# Patient Record
Sex: FEMALE | Race: BLACK OR AFRICAN AMERICAN | NOT HISPANIC OR LATINO | URBAN - METROPOLITAN AREA
[De-identification: names, ages, dates, MRNs, and addresses within clinical notes are randomized per-mention and may not be internally consistent; named-entity substitution may affect disease eponyms.]

---

## 2017-07-24 ENCOUNTER — EMERGENCY (EMERGENCY)
Facility: HOSPITAL | Age: 37
LOS: 1 days | Discharge: HOME | End: 2017-07-24
Attending: EMERGENCY MEDICINE | Admitting: EMERGENCY MEDICINE
Payer: COMMERCIAL

## 2017-07-24 VITALS
SYSTOLIC BLOOD PRESSURE: 141 MMHG | RESPIRATION RATE: 20 BRPM | HEART RATE: 90 BPM | DIASTOLIC BLOOD PRESSURE: 93 MMHG | TEMPERATURE: 98 F | OXYGEN SATURATION: 100 %

## 2017-07-24 PROCEDURE — 99284 EMERGENCY DEPT VISIT MOD MDM: CPT

## 2017-07-24 RX ORDER — ONDANSETRON 8 MG/1
4 TABLET, FILM COATED ORAL ONCE
Qty: 0 | Refills: 0 | Status: COMPLETED | OUTPATIENT
Start: 2017-07-24 | End: 2017-07-24

## 2017-07-24 RX ADMIN — ONDANSETRON 4 MILLIGRAM(S): 8 TABLET, FILM COATED ORAL at 23:39

## 2017-07-24 NOTE — ED ADULT NURSE NOTE - OBJECTIVE STATEMENT
36 y/o F presents ambulatory to ED c/o constant nausea, intermittent indigestion, tolerating liquids but vomiting after solids, feels like something is stuck in her throat x 1 week. Pt denies abdominal pain, diarrhea, headache, dizziness, SOB, cough, chest pain, palpitations, f/c. Pt reports mild back pain with movement. Safety maintained. 36 y/o F presents ambulatory to ED c/o constant nausea, intermittent indigestion, tolerating liquids but vomiting after solids, feels like something is stuck in her throat x 1 week. Pt denies abdominal pain, diarrhea, headache, dizziness, SOB, cough, chest pain, palpitations, f/c. Pt reports mild back pain with movement. Safety maintained.  0200: Pt AAOx4, NAD, resting comfortably in bed. Pt tolerated PO with no n/v/d, abd pain. Pt denies headache, dizziness, chest pain, cough, SOB, abdominal pain, n/v/d, urinary symptoms, fevers, chills, weakness at this time. Pt discharged as per MD, IV removed as per MD, pt ambulated independently out of ED, steady gait noted.

## 2017-07-24 NOTE — ED PROVIDER NOTE - ATTENDING CONTRIBUTION TO CARE
Dr. Walker (Attending Physician)  Pt. with sensation of food getting stuck in throat. Tolerating liquids.  Tolerates some solids.  PO challenged in ED and able to tolerate.  Heart and lungs clear.  Will check cxr, basic labs, likely have patient follow-up with gastroenterology.

## 2017-07-24 NOTE — ED PROVIDER NOTE - NS ED ROS FT
CONSTITUTIONAL: No fevers, no chills  Eyes: no visual changes  Ears: no ear drainage, no ear pain  Nose: no nasal congestion  Mouth/Throat: no sore throat  Cardiovascular: No Chest pain  Respiratory: No SOB  Gastrointestinal: +n/v, no abd pain  Genitourinary: no dysuria, no hematuria  SKIN: no rashes.  NEURO: no headache  PSYCHIATRIC: no known mental health issues.

## 2017-07-24 NOTE — ED ADULT NURSE NOTE - CHPI ED SYMPTOMS NEG
no fever/no blood in stool/no diarrhea/no chills/no dysuria/no burning urination/no hematuria/no abdominal distension

## 2017-07-24 NOTE — ED PROVIDER NOTE - PROGRESS NOTE DETAILS
pt tolerating PO will d/c home with GI follow up. Patient was endorsed to me by Dr. Walker     at   1230   to follow up results of   xray chest      and dispo pending these results and re evaluation. Upon my re evaluation of the patient I found that xray normal. pt feeling better. back pain worse with movement. will follow up with gi. Crow Sinha M.D., Attending Physician

## 2017-07-24 NOTE — ED PROVIDER NOTE - OBJECTIVE STATEMENT
37 YOF presents to ED with 1 week of indigestion, feeling of food getting stuck in her throat, non-smoker. Pt states she vomits when she eats solids is able to tolerate liquids. Pt states she is constantly nauseated. Pt denies any abd pain, neck pain, headaches, chest pain, sob. Pt also has mid back pain with movement.       No PMD.

## 2017-07-24 NOTE — ED ADULT NURSE NOTE - DISCHARGE TEACHING
Kenisha MANUEL, pt verbalizes understanding to f/u with GI, take Maalox for indigestion, and return to ED for any worsening symptoms.

## 2017-07-24 NOTE — ED PROVIDER NOTE - MEDICAL DECISION MAKING DETAILS
N/v x 1 week able to tolerate fluids but not solids, sensation of food getting stuck. Will check electrolytes if normal d/c home with GI follow up.

## 2017-07-25 VITALS
OXYGEN SATURATION: 100 % | HEART RATE: 77 BPM | RESPIRATION RATE: 18 BRPM | DIASTOLIC BLOOD PRESSURE: 88 MMHG | TEMPERATURE: 98 F | SYSTOLIC BLOOD PRESSURE: 131 MMHG

## 2017-07-25 LAB
ALBUMIN SERPL ELPH-MCNC: 4 G/DL — SIGNIFICANT CHANGE UP (ref 3.3–5)
ALP SERPL-CCNC: 49 U/L — SIGNIFICANT CHANGE UP (ref 40–120)
ALT FLD-CCNC: 11 U/L RC — SIGNIFICANT CHANGE UP (ref 10–45)
ANION GAP SERPL CALC-SCNC: 12 MMOL/L — SIGNIFICANT CHANGE UP (ref 5–17)
AST SERPL-CCNC: 15 U/L — SIGNIFICANT CHANGE UP (ref 10–40)
BASOPHILS # BLD AUTO: 0 K/UL — SIGNIFICANT CHANGE UP (ref 0–0.2)
BASOPHILS NFR BLD AUTO: 0 % — SIGNIFICANT CHANGE UP (ref 0–2)
BILIRUB SERPL-MCNC: 0.3 MG/DL — SIGNIFICANT CHANGE UP (ref 0.2–1.2)
BUN SERPL-MCNC: 12 MG/DL — SIGNIFICANT CHANGE UP (ref 7–23)
CALCIUM SERPL-MCNC: 9.2 MG/DL — SIGNIFICANT CHANGE UP (ref 8.4–10.5)
CHLORIDE SERPL-SCNC: 106 MMOL/L — SIGNIFICANT CHANGE UP (ref 96–108)
CO2 SERPL-SCNC: 25 MMOL/L — SIGNIFICANT CHANGE UP (ref 22–31)
CREAT SERPL-MCNC: 0.62 MG/DL — SIGNIFICANT CHANGE UP (ref 0.5–1.3)
EOSINOPHIL # BLD AUTO: 0.5 K/UL — SIGNIFICANT CHANGE UP (ref 0–0.5)
EOSINOPHIL NFR BLD AUTO: 5.9 % — SIGNIFICANT CHANGE UP (ref 0–6)
GLUCOSE SERPL-MCNC: 136 MG/DL — HIGH (ref 70–99)
HCT VFR BLD CALC: 31.9 % — LOW (ref 34.5–45)
HGB BLD-MCNC: 10.4 G/DL — LOW (ref 11.5–15.5)
LIDOCAIN IGE QN: 33 U/L — SIGNIFICANT CHANGE UP (ref 7–60)
LYMPHOCYTES # BLD AUTO: 3.6 K/UL — HIGH (ref 1–3.3)
LYMPHOCYTES # BLD AUTO: 40.2 % — SIGNIFICANT CHANGE UP (ref 13–44)
MCHC RBC-ENTMCNC: 26.4 PG — LOW (ref 27–34)
MCHC RBC-ENTMCNC: 32.5 GM/DL — SIGNIFICANT CHANGE UP (ref 32–36)
MCV RBC AUTO: 81.1 FL — SIGNIFICANT CHANGE UP (ref 80–100)
MONOCYTES # BLD AUTO: 0.8 K/UL — SIGNIFICANT CHANGE UP (ref 0–0.9)
MONOCYTES NFR BLD AUTO: 9.4 % — SIGNIFICANT CHANGE UP (ref 2–14)
NEUTROPHILS # BLD AUTO: 4 K/UL — SIGNIFICANT CHANGE UP (ref 1.8–7.4)
NEUTROPHILS NFR BLD AUTO: 44.5 % — SIGNIFICANT CHANGE UP (ref 43–77)
PLATELET # BLD AUTO: 382 K/UL — SIGNIFICANT CHANGE UP (ref 150–400)
POTASSIUM SERPL-MCNC: 3.6 MMOL/L — SIGNIFICANT CHANGE UP (ref 3.5–5.3)
POTASSIUM SERPL-SCNC: 3.6 MMOL/L — SIGNIFICANT CHANGE UP (ref 3.5–5.3)
PROT SERPL-MCNC: 7.9 G/DL — SIGNIFICANT CHANGE UP (ref 6–8.3)
RBC # BLD: 3.93 M/UL — SIGNIFICANT CHANGE UP (ref 3.8–5.2)
RBC # FLD: 14.4 % — SIGNIFICANT CHANGE UP (ref 10.3–14.5)
SODIUM SERPL-SCNC: 143 MMOL/L — SIGNIFICANT CHANGE UP (ref 135–145)
WBC # BLD: 9 K/UL — SIGNIFICANT CHANGE UP (ref 3.8–10.5)
WBC # FLD AUTO: 9 K/UL — SIGNIFICANT CHANGE UP (ref 3.8–10.5)

## 2017-07-25 PROCEDURE — 99284 EMERGENCY DEPT VISIT MOD MDM: CPT | Mod: 25

## 2017-07-25 PROCEDURE — 80053 COMPREHEN METABOLIC PANEL: CPT

## 2017-07-25 PROCEDURE — 85027 COMPLETE CBC AUTOMATED: CPT

## 2017-07-25 PROCEDURE — 71046 X-RAY EXAM CHEST 2 VIEWS: CPT

## 2017-07-25 PROCEDURE — 71020: CPT | Mod: 26

## 2017-07-25 PROCEDURE — 83690 ASSAY OF LIPASE: CPT

## 2017-07-25 PROCEDURE — 96374 THER/PROPH/DIAG INJ IV PUSH: CPT

## 2017-07-25 RX ORDER — LIDOCAINE 4 G/100G
10 CREAM TOPICAL ONCE
Qty: 0 | Refills: 0 | Status: COMPLETED | OUTPATIENT
Start: 2017-07-25 | End: 2017-07-25

## 2017-07-25 RX ADMIN — LIDOCAINE 10 MILLILITER(S): 4 CREAM TOPICAL at 01:09

## 2017-07-25 RX ADMIN — Medication 30 MILLILITER(S): at 01:09

## 2017-10-17 ENCOUNTER — APPOINTMENT (OUTPATIENT)
Dept: GASTROENTEROLOGY | Facility: CLINIC | Age: 37
End: 2017-10-17

## 2023-03-24 ENCOUNTER — NON-APPOINTMENT (OUTPATIENT)
Age: 43
End: 2023-03-24

## 2023-03-25 ENCOUNTER — EMERGENCY (EMERGENCY)
Facility: HOSPITAL | Age: 43
LOS: 1 days | Discharge: HOME | End: 2023-03-25
Attending: EMERGENCY MEDICINE
Payer: MEDICAID

## 2023-03-25 VITALS
RESPIRATION RATE: 18 BRPM | OXYGEN SATURATION: 98 % | SYSTOLIC BLOOD PRESSURE: 152 MMHG | DIASTOLIC BLOOD PRESSURE: 95 MMHG | TEMPERATURE: 99 F | HEART RATE: 92 BPM | HEIGHT: 63 IN

## 2023-03-25 PROCEDURE — 87075 CULTR BACTERIA EXCEPT BLOOD: CPT

## 2023-03-25 PROCEDURE — 87205 SMEAR GRAM STAIN: CPT

## 2023-03-25 PROCEDURE — 76642 ULTRASOUND BREAST LIMITED: CPT

## 2023-03-25 PROCEDURE — 99284 EMERGENCY DEPT VISIT MOD MDM: CPT

## 2023-03-25 PROCEDURE — 87070 CULTURE OTHR SPECIMN AEROBIC: CPT

## 2023-03-25 PROCEDURE — 10060 I&D ABSCESS SIMPLE/SINGLE: CPT

## 2023-03-25 PROCEDURE — 99284 EMERGENCY DEPT VISIT MOD MDM: CPT | Mod: 25

## 2023-03-25 PROCEDURE — 87077 CULTURE AEROBIC IDENTIFY: CPT

## 2023-03-25 PROCEDURE — 76642 ULTRASOUND BREAST LIMITED: CPT | Mod: 26,LT

## 2023-03-25 NOTE — ED PROVIDER NOTE - PHYSICAL EXAMINATION
left 4 oclock breast lesion that is indurated and tenderness to palpation with warmth and erythema chaperoned by MAR LEUNG Shaperio  non-tachycardic  non-tachypneic   mild distress secondary to pain

## 2023-03-25 NOTE — ED PROVIDER NOTE - PATIENT PORTAL LINK FT
You can access the FollowMyHealth Patient Portal offered by [facility] by registering at the following website: [email or web address]. By joining [email or web address] [email or web address] portal, you will also be able to view your health information using other applications (apps) compatible with our system.

## 2023-03-25 NOTE — ED PROVIDER NOTE - ATTENDING CONTRIBUTION TO CARE
See MDM above. pt is a 43 y/o female with pain to her l breast around 4 oclock, with redness but no drainage, no fevers, no prior breast issues, will need labs, us, signed out pending work up. pt is a 43 y/o female with pain to her l breast around 4 oclock, with redness but no drainage, no fevers, with prior abscess noted drained in the office a few weeks ago but reoccurred, will need labs, us, signed out pending work up.

## 2023-03-25 NOTE — PROCEDURE NOTE - ADDITIONAL PROCEDURE DETAILS
prior I&D attempt by ED with 1cm incision  local 1% lido injected into previously made incision  6cc of pus aspirated from abscess  incision deepened by additional 1mm to access abscess cavity, no additional pus expressed  packed with 1/2 inch iodoform packing strip  covered with gauze and tegaderm

## 2023-03-25 NOTE — CONSULT NOTE ADULT - SUBJECTIVE AND OBJECTIVE BOX
General Surgery Consult  Consulting surgical team: [facility]  Consulting attending: Dr. Reyes    HPI:  42F hx of ventral hernia repair with mesh c/b infection s/p excision +MRSA per chart review and R breast abscess drained in a doctor's office a few years ago here with 2 weeks of L breast pain and chills, denies fevers. Mammogram 2 years ago reportedly normal, no family hx of breast cancer. US 1p3m3ay superficial breast abscess. ED attempted I&D prior to surgical consultation without successful drainage of pus. Breast surgery consulted.    PAST MEDICAL HISTORY:  Asthma        PAST SURGICAL HISTORY:  S/P hernia repair        MEDICATIONS:      ALLERGIES:  No Known Allergies      VITALS & I/Os:  Vital Signs Last 24 Hrs  T(C): 37.1 (25 Mar 2023 11:00), Max: 37.1 (25 Mar 2023 11:00)  T(F): 98.7 (25 Mar 2023 11:00), Max: 98.7 (25 Mar 2023 11:00)  HR: 92 (25 Mar 2023 11:00) (92 - 92)  BP: 152/95 (25 Mar 2023 11:00) (152/95 - 152/95)  BP(mean): --  RR: 18 (25 Mar 2023 11:00) (18 - 18)  SpO2: 98% (25 Mar 2023 11:00) (98% - 98%)        I&O's Summary      PHYSICAL EXAM:  General: No acute distress  very pleasant  Respiratory: Nonlabored  Cardiovascular: appears well perfused  Breast: no palpable masses in L breast  L central fluctuance with surrounding 4cm area of induration with darkened skin changes and significant tenderness approx 8cm inferior to nipple  Abdominal: Soft, nondistended, nontender  Extremities: Warm    LABS:          Lactate:                  IMAGING:    ACC: [id number] EXAM:  US BREAST LIMITED LT   ORDERED BY: ADELINA CRUZ     PROCEDURE DATE:  03/25/2023          INTERPRETATION:  CLINICAL INDICATION:  Mastitis.  Rule out abscess.    TECHNIQUE:  Limited sonographic evaluation of the left breast was   performed, targeted to the area of pain in the 4-5:00 breast. ?Evaluation   was performed by the technologist and submitted for interpretation..    This study was performed exclusively for the purpose of excluding the   presence of abscess in the clinical setting of mastitis.?  ?  FINDINGS: Left breast, 4-5:00, 10 cm from the nipple is a fluid   collection measuring approximately 2.4 x 1.5 x 3 cm with overlying skin   thickening and surrounding hyperemia, suspicious for abscess.    IMPRESSION:  Left breast fluid collection, suspicious for abscess in the appropriate   clinical setting.  Clinical management is recommended.    If symptoms do not resolve clinically, additional imaging in a dedicated   breast imaging center should be performed to exclude the possibility of   malignancy.    --- End of Report ---      DEBBY ANTHONY MD; Attending Radiologist  This document has been electronically signed. Mar 25 2023  2:00PM

## 2023-03-25 NOTE — ED PROVIDER NOTE - PROGRESS NOTE DETAILS
I was not involved in the care of this patient and am only entering information to back log for downtime. Please see paper chart for isolation and medical management details, as the mandatory fields in the disposition section may not be accurate. - Luisito Vega PA-C MDM: pt is a 41 y/o female with an abscess noted, us ordered, labs, signed out pending work up.

## 2023-03-25 NOTE — ED PROVIDER NOTE - SHIFT CHANGE DETAILS
Nino Thomson MD FACEP note of transfer at the usual time of sign out: Receiving team will follow up on labs, analgesia, any clinical imaging, reassess and disposition as clinically indicated.  Details of patient and plan conveyed to receiving physician and conveyed back for understanding.  There were no questions at this time about the patient's status, disposition, and plan. Patient's care to be taken over by receiving physician at this time, all decisions regarding the progression of care will be made at their discretion.

## 2023-03-25 NOTE — ED PROVIDER NOTE - CLINICAL SUMMARY MEDICAL DECISION MAKING FREE TEXT BOX
Nino Thomson MD, FACEP: In this physician's medical judgement based on clinical history and physical exam the patient's signs and symptoms lead to differential diagnoses which includes but is not limited to: left breast abscess, breast cancer    Labs were ordered and independently reviewed by me.  Imaging was ordered and reviewed by me.      Appropriate medications for the patient's presenting complaints were ordered, and effects were reassessed.     Escalation to admission/observation was considered.    Will follow up on labs, therapeutics, imaging, reassess and disposition as clinically indicated.  *The above represents an initial assessment/impression. Please refer to my progress notes below for potential changes in patient clinical course*     Patient  understands anticipatory guidance and was given strict return and follow up precautions. The patient has been informed of all concerning signs and symptoms to return to Emergency Department, the necessity to follow up with PMD/Clinic/follow up provided within 2-3 days was explained, and the patient reports understanding of above with capacity and insight if patient disposition is to be home. Nino Thomson MD, FACEP: In this physician's medical judgement based on clinical history and physical exam the patient's signs and symptoms lead to differential diagnoses which includes but is not limited to: left breast abscess, breast cancer    Labs were ordered and independently reviewed by me.  Imaging was ordered and reviewed by me.      Appropriate medications for the patient's presenting complaints were ordered, and effects were reassessed.     Escalation to admission/observation was considered.      Will follow up on labs, therapeutics, imaging, reassess and disposition as clinically indicated.  *The above represents an initial assessment/impression. Please refer to my progress notes below for potential changes in patient clinical course*     Patient  understands anticipatory guidance and was given strict return and follow up precautions. The patient has been informed of all concerning signs and symptoms to return to Emergency Department, the necessity to follow up with PMD/Clinic/follow up provided within 2-3 days was explained, and the patient reports understanding of above with capacity and insight if patient disposition is to be home.

## 2023-03-25 NOTE — CONSULT NOTE ADULT - ASSESSMENT
42F here with L breast abscess    s/p bedside I&D    Recommendations:  -1 week of abx, consider clinda given hx of MRSA  -remove packing tomorrow, shower with soap and water, keep area clean and cover with dry clean gauze as needed  -follow up outpatient with Dr. Sylvia Reyes in 1-2 weeks  -OTC pain medication prn    [facility]  [id number] 42F here with L breast abscess    s/p bedside I&D    Recommendations:  -1 week of abx, consider clinda given hx of MRSA  -remove packing tomorrow, shower with soap and water, keep area clean and cover with dry clean gauze as needed  -follow up outpatient with Dr. Sylvia Reyes in 1-2 weeks  -pain medication prn    Discussed with Dr. Reyes    [facility]  [id number]

## 2023-03-26 NOTE — ED POST DISCHARGE NOTE - ADDITIONAL DOCUMENTATION
Agree with above, Nino Thomson MD, FACEP Agree with above, Nino Thomson MD, FACEP.  3/31/23- Ha UGARTE: spoke with patient, the area where abscess was I&D' is improving,  but improving. pt taking clindamycin. pt instructed to follow up with her PCP, she said she plans to in the next 2-3 weeks, I told her to move that appointment up to next week. pt understands and agrees to. pt encouraged to continue oral antibiotics and that can apply warm compresses to the area as well. pt understands. all questions/concerns addressed. strict return precautions given.

## 2023-03-26 NOTE — ED POST DISCHARGE NOTE - DETAILS
3/26 Called to ensure patient is aware of appropriate f/u for mammo/breast US. - VIKI OkeefeC 3/27: spoke w/ pt, informed her of recs for f/u with OBGYN/breast center for dedicated imaging (mammo/repeat US) if sxs do not improve. Pt reports improved sxs, states she had a mammogram 2 years ago which was "normal". Reiterated if sxs worsen or do not improve to f/u with GYN for re-eval and testing to exclude malignancy. Pt acknowledged understanding, all questions answered, appreciative of call. - Mirza Laws PA-C. 3/28: abscess cx + few prevotella bivia, susceptibilities not performed. Attempted to call to inform pt of result and reiterate abx usage. No answer, left voice message for pt to call back admin line. - Mirza Laws PA-C

## 2023-03-26 NOTE — ED POST DISCHARGE NOTE - OTHER COMMUNICATION
3/30: Called pt back to discuss cx results and to see how she is feeling. No answer, LM to call back [phone]. Rochelle Horton PA-C

## 2023-03-26 NOTE — ED POST DISCHARGE NOTE - RESULT SUMMARY
US breast + L breast abscess (I&D by surgery, rx'ed abx, and given f/u), rads recommending dedicated breast imaging if symptoms do not resolve to exclude malignancy

## 2023-04-24 ENCOUNTER — EMERGENCY (EMERGENCY)
Facility: HOSPITAL | Age: 43
LOS: 1 days | Discharge: HOME | End: 2023-04-24
Attending: EMERGENCY MEDICINE
Payer: MEDICAID

## 2023-04-24 VITALS
HEART RATE: 92 BPM | OXYGEN SATURATION: 98 % | HEIGHT: 63 IN | SYSTOLIC BLOOD PRESSURE: 152 MMHG | WEIGHT: 197.98 LBS | DIASTOLIC BLOOD PRESSURE: 108 MMHG | RESPIRATION RATE: 20 BRPM | TEMPERATURE: 98 F

## 2023-04-24 VITALS
OXYGEN SATURATION: 99 % | HEART RATE: 80 BPM | DIASTOLIC BLOOD PRESSURE: 90 MMHG | RESPIRATION RATE: 16 BRPM | TEMPERATURE: 98 F | SYSTOLIC BLOOD PRESSURE: 131 MMHG

## 2023-04-24 LAB
ALBUMIN SERPL ELPH-MCNC: 4 G/DL — SIGNIFICANT CHANGE UP (ref 3.3–5)
ALP SERPL-CCNC: 53 U/L — SIGNIFICANT CHANGE UP (ref 40–120)
ALT FLD-CCNC: 20 U/L — SIGNIFICANT CHANGE UP (ref 10–45)
ANION GAP SERPL CALC-SCNC: 13 MMOL/L — SIGNIFICANT CHANGE UP (ref 5–17)
AST SERPL-CCNC: 18 U/L — SIGNIFICANT CHANGE UP (ref 10–40)
BASOPHILS # BLD AUTO: 0.05 K/UL — SIGNIFICANT CHANGE UP (ref 0–0.2)
BASOPHILS NFR BLD AUTO: 0.3 % — SIGNIFICANT CHANGE UP (ref 0–2)
BILIRUB SERPL-MCNC: 0.3 MG/DL — SIGNIFICANT CHANGE UP (ref 0.2–1.2)
BUN SERPL-MCNC: 7 MG/DL — SIGNIFICANT CHANGE UP (ref 7–23)
CALCIUM SERPL-MCNC: 9.1 MG/DL — SIGNIFICANT CHANGE UP (ref 8.4–10.5)
CHLORIDE SERPL-SCNC: 98 MMOL/L — SIGNIFICANT CHANGE UP (ref 96–108)
CO2 SERPL-SCNC: 24 MMOL/L — SIGNIFICANT CHANGE UP (ref 22–31)
CREAT SERPL-MCNC: 0.42 MG/DL — LOW (ref 0.5–1.3)
EGFR: 124 ML/MIN/1.73M2 — SIGNIFICANT CHANGE UP
EOSINOPHIL # BLD AUTO: 1.07 K/UL — HIGH (ref 0–0.5)
EOSINOPHIL NFR BLD AUTO: 7.3 % — HIGH (ref 0–6)
GLUCOSE SERPL-MCNC: 256 MG/DL — HIGH (ref 70–99)
HCT VFR BLD CALC: 37.1 % — SIGNIFICANT CHANGE UP (ref 34.5–45)
HGB BLD-MCNC: 11.6 G/DL — SIGNIFICANT CHANGE UP (ref 11.5–15.5)
IMM GRANULOCYTES NFR BLD AUTO: 0.5 % — SIGNIFICANT CHANGE UP (ref 0–0.9)
LYMPHOCYTES # BLD AUTO: 15.4 % — SIGNIFICANT CHANGE UP (ref 13–44)
LYMPHOCYTES # BLD AUTO: 2.27 K/UL — SIGNIFICANT CHANGE UP (ref 1–3.3)
MCHC RBC-ENTMCNC: 26.7 PG — LOW (ref 27–34)
MCHC RBC-ENTMCNC: 31.3 GM/DL — LOW (ref 32–36)
MCV RBC AUTO: 85.3 FL — SIGNIFICANT CHANGE UP (ref 80–100)
MONOCYTES # BLD AUTO: 0.88 K/UL — SIGNIFICANT CHANGE UP (ref 0–0.9)
MONOCYTES NFR BLD AUTO: 6 % — SIGNIFICANT CHANGE UP (ref 2–14)
NEUTROPHILS # BLD AUTO: 10.36 K/UL — HIGH (ref 1.8–7.4)
NEUTROPHILS NFR BLD AUTO: 70.5 % — SIGNIFICANT CHANGE UP (ref 43–77)
NRBC # BLD: 0 /100 WBCS — SIGNIFICANT CHANGE UP (ref 0–0)
PLATELET # BLD AUTO: 367 K/UL — SIGNIFICANT CHANGE UP (ref 150–400)
POTASSIUM SERPL-MCNC: 3.3 MMOL/L — LOW (ref 3.5–5.3)
POTASSIUM SERPL-SCNC: 3.3 MMOL/L — LOW (ref 3.5–5.3)
PROT SERPL-MCNC: 8 G/DL — SIGNIFICANT CHANGE UP (ref 6–8.3)
RBC # BLD: 4.35 M/UL — SIGNIFICANT CHANGE UP (ref 3.8–5.2)
RBC # FLD: 14.6 % — HIGH (ref 10.3–14.5)
SODIUM SERPL-SCNC: 135 MMOL/L — SIGNIFICANT CHANGE UP (ref 135–145)
WBC # BLD: 14.71 K/UL — HIGH (ref 3.8–10.5)
WBC # FLD AUTO: 14.71 K/UL — HIGH (ref 3.8–10.5)

## 2023-04-24 PROCEDURE — 99284 EMERGENCY DEPT VISIT MOD MDM: CPT | Mod: 25

## 2023-04-24 PROCEDURE — 10061 I&D ABSCESS COMP/MULTIPLE: CPT

## 2023-04-24 PROCEDURE — 87186 SC STD MICRODIL/AGAR DIL: CPT

## 2023-04-24 PROCEDURE — 99285 EMERGENCY DEPT VISIT HI MDM: CPT

## 2023-04-24 PROCEDURE — 87077 CULTURE AEROBIC IDENTIFY: CPT

## 2023-04-24 PROCEDURE — 85025 COMPLETE CBC W/AUTO DIFF WBC: CPT

## 2023-04-24 PROCEDURE — 19020 MASTOTOMY EXPL DRG ABSC DP: CPT

## 2023-04-24 PROCEDURE — 87070 CULTURE OTHR SPECIMN AEROBIC: CPT

## 2023-04-24 PROCEDURE — 36415 COLL VENOUS BLD VENIPUNCTURE: CPT

## 2023-04-24 PROCEDURE — 96374 THER/PROPH/DIAG INJ IV PUSH: CPT | Mod: XU

## 2023-04-24 PROCEDURE — 87205 SMEAR GRAM STAIN: CPT

## 2023-04-24 PROCEDURE — 80053 COMPREHEN METABOLIC PANEL: CPT

## 2023-04-24 RX ORDER — KETOROLAC TROMETHAMINE 30 MG/ML
15 SYRINGE (ML) INJECTION ONCE
Refills: 0 | Status: DISCONTINUED | OUTPATIENT
Start: 2023-04-24 | End: 2023-04-24

## 2023-04-24 RX ORDER — LIDOCAINE HYDROCHLORIDE AND EPINEPHRINE 10; 10 MG/ML; UG/ML
20 INJECTION, SOLUTION INFILTRATION; PERINEURAL ONCE
Refills: 0 | Status: COMPLETED | OUTPATIENT
Start: 2023-04-24 | End: 2023-04-24

## 2023-04-24 RX ADMIN — LIDOCAINE HYDROCHLORIDE AND EPINEPHRINE 20 MILLILITER(S): 10; 10 INJECTION, SOLUTION INFILTRATION; PERINEURAL at 12:35

## 2023-04-24 RX ADMIN — Medication 15 MILLIGRAM(S): at 10:49

## 2023-04-24 NOTE — ED PROVIDER NOTE - PATIENT PORTAL LINK FT
You can access the FollowMyHealth Patient Portal offered by [facility] by registering at the following website: [email or web address]. By joining [employer]’s FollowMyHealth portal, you will also be able to view your health information using other applications (apps) compatible with our system.

## 2023-04-24 NOTE — ED PROVIDER NOTE - PHYSICAL EXAMINATION
PHYSICAL EXAM:  CONSTITUTIONAL: Well appearing, awake, alert, oriented to person, place, time/situation and in no apparent distress.  HEAD: Atraumatic  EYES: Clear bilaterally, pupils equal, round and reactive to light.  ENMT: Airway patent, Nasal mucosa clear. Mouth with normal mucosa. Uvula is midline.   CARDIAC: Normal rate, regular rhythm. +S1/S2. No murmurs, rubs or gallops.  RESPIRATORY: Breathing unlabored. Breath sounds clear and equal bilaterally.  ABDOMEN:  Soft, nontender, nondistended. No rebound tenderness or guarding.  NEUROLOGICAL: Alert and oriented, no focal deficits.  Left breast: skin thickening, swelling and etythema to 4:00 position, draining purulent fluid, exquisitely ttp

## 2023-04-24 NOTE — ED PROVIDER NOTE - CLINICAL SUMMARY MEDICAL DECISION MAKING FREE TEXT BOX
42-year-old female with past medical history of hypertension diabetes abdominal hernia and breast abscess presenting with pain and swelling to the left breast.  Patient was seen here 1 month prior for similar symptoms had an incision and drainage and was given a course of antibiotics.  Vital signs unremarkable physical exam with skin thickening, erythema swelling to the left breast exquisite tenderness to palpation and active purulent drainage.  Plan to consult breast surgery control pain and check CBC CMP. 42-year-old female with past medical history of hypertension diabetes abdominal hernia and breast abscess presenting with pain and swelling to the left breast.  Patient was seen here 1 month prior for similar symptoms had an incision and drainage and was given a course of antibiotics.  Vital signs unremarkable physical exam with skin thickening, erythema swelling to the left breast exquisite tenderness to palpation and active purulent drainage.  Plan to consult breast surgery control pain and check CBC CMP.    Yosvany: 42 year old female with pmhx of htn, dm, abdominal hernia, here with swelling to left breast. seen here 1 month ago for I&D and given course of abx, returning with recurrent abscess and rainage. + thickening of left breast skin. swelling, and pus discharge from left 6'oclock. will get labs, pain control, culture the area, surgery consult, reassess.

## 2023-04-24 NOTE — ED PROVIDER NOTE - NSFOLLOWUPINSTRUCTIONS_ED_ALL_ED_FT
Please take your full 7-day course of antibiotics, follow the directions on the bottle.  Please follow-up with the surgeon within one week.     Abscess Incision and Drainage    WHAT YOU NEED TO KNOW:    An abscess incision and drainage (I and D) is a procedure to drain pus from an abscess and clean it out so it can heal.    DISCHARGE INSTRUCTIONS:    Contact your healthcare provider if:    The area around your abscess has red streaks or is warm and painful.    You have a fever or chills.    You have increased redness, swelling, or pain in your wound.    Your wound does not start to heal after a few days.    Your abscess returns.    You have questions or concerns about your condition or care.  Medicines:    NSAIDs, such as ibuprofen, help decrease swelling, pain, and fever. NSAIDs can cause stomach bleeding or kidney problems in certain people. If you take blood thinner medicine, always ask your healthcare provider if NSAIDs are safe for you. Always read the medicine label and follow directions.    Take your medicine as directed. Contact your healthcare provider if you think your medicine is not helping or if you have side effects. Tell your provider if you are allergic to any medicine. Keep a list of the medicines, vitamins, and herbs you take. Include the amounts, and when and why you take them. Bring the list or the pill bottles to follow-up visits. Carry your medicine list with you in case of an emergency.  Care for your wound as directed:    Do not remove your bandage unless your healthcare provider says it is okay. Keep the bandage clean and dry. Remove your bandage and clean the wound once your healthcare provider gives you directions.    Apply heat on the bandage over your wound for 20 to 30 minutes every 2 hours for as many days as directed. This will increase blood flow to the area and help it heal.    Elevate your wound above level of your heart as often as you can. This will help decrease swelling and pain. Prop your wounded area on pillows or blankets to keep it elevated comfortably.  Follow up with your healthcare provider as directed: You may need to return in 1 to 3 days to have the gauze in your wound removed and your wound examined. You may be taught how to change the gauze in your wound. Write down your questions so you remember to ask them during your visits.

## 2023-04-24 NOTE — CONSULT NOTE ADULT - ASSESSMENT
42F hx of ventral hernia repair with mesh c/b infection s/p excision +MRSA per chart review, previous R breast abscess, who presents with recurrent L breast abscess (s/p I/D 3/25). Surgery consulted for I/D.     Plan:  -S/P bedside I/D (see procedure note)  -DC on Doxycycline x 7 days   -Must follow up outpatient with Dr. Ciara Irvin this week given recurrence   -Dispo per ED     to be discussed with Attending Surgeon Dr. Ciara Kumar MD PGY2  [facility]   [id number])  Obstetrics and Gynecology  [address] [place] [address]  Phone: [phone]  Fax: [phone]  Follow Up Time:

## 2023-04-24 NOTE — ED ADULT NURSE NOTE - OBJECTIVE STATEMENT
41 y/o female coming to the ER with c/o abscess. A&Ox4. PMH DM, HTN. Patient states in march she developed an abscess on the left breast. Patient was seen at [facility] and the abscess was I&D patient was placed on oral antibiotics. Wednesday the patient noticed that the abscess had returned, and was red, swollen, and painful. While changing into the gown today the abscess spontaneously ruptured. Patient endorses taking tylenol prior to arrival with no relief. patient endorses no fevers, but states she has had chills. Abscess to the inferior lateral left breast open and actively draining. Culture taken by MD Segundo to be sent to the lab. Patient denies chest pain, fever, chills, n/v/d, urinary symptoms, abdominal pain. Safety measures maintained. Bed in the lowest position. Call bell within reach.  Provider at the bedside. No acute distress noted or further complaints at this time.

## 2023-04-24 NOTE — PROCEDURE NOTE - ADDITIONAL PROCEDURE DETAILS
Exam with spontaneously draining L breast abscess. Bedside exam with erythema/induration, minimal fluctuance. Bedside US w small 1.5x1.5cm pocket. Aspirated and incised with 11 blade. ~5cc of purulent drainage expressed. Wound cx obtained. Wound packed with packing strip and covered with 4x4.

## 2023-04-24 NOTE — ED PROVIDER NOTE - OBJECTIVE STATEMENT
42-year-old female with past medical history of hypertension diabetes and remote abdominal hernia repair presenting with left-sided breast pain and swelling.  Patient was seen here 1 month ago for similar pain and swelling, was determined to have a superficial breast abscess which was drained by breast surgery, patient then completed a 7-day course of clindamycin.  Patient notes that since Wednesday has had pain and swelling to the same area which has progressively worsened.  Patient denies any associated fevers, notes that she took Tylenol 2 hours prior to presentation with no relief.

## 2023-04-24 NOTE — CONSULT NOTE ADULT - SUBJECTIVE AND OBJECTIVE BOX
SURGERY CONSULT NOTE  --------------------------------------------------------------------------------------------    Patient is a 42y old  Female who presents with a chief complaint of L breast pain     HPI:   42F hx of ventral hernia repair with mesh c/b infection s/p excision +MRSA per chart review and R breast abscess drained in a doctor's office a few years ago here with 2-3 days L breast pain. Denies fevers, chills, nausea, vomiting, SOB. Of note, patient was seen 3/25 for L breast abscess, underwent bedside I/D and DC'd on PO clindamycin x 7 days. Patient reports she did not follow up with breast surgeon outpatient. Mammogram 2 years ago reportedly normal, no family hx of breast cancer. Surgery consulted for I/D.     In ED, patient HDS afebrile. Labs w leukocytosis.       PAST MEDICAL & SURGICAL HISTORY:  Asthma  in childhood      S/P hernia repair  umbilical 2009        FAMILY HISTORY:    [] Family history not pertinent as reviewed with the patient and family      ALLERGIES: No Known Allergies    CURRENT MEDICATIONS  MEDICATIONS (STANDING): lidocaine 1%/epinephrine 1:100,000 Inj 20 milliLiter(s) Local Injection Once    MEDICATIONS (PRN):  --------------------------------------------------------------------------------------------    Vitals:   T(C): 36.4 (04-24-23 @ 12:40), Max: 36.4 (04-24-23 @ 10:02)  HR: 73 (04-24-23 @ 12:40) (73 - 92)  BP: 122/79 (04-24-23 @ 12:40) (122/79 - 152/108)  RR: 20 (04-24-23 @ 12:40) (20 - 20)  SpO2: 100% (04-24-23 @ 12:40) (98% - 100%)  CAPILLARY BLOOD GLUCOSE        CAPILLARY BLOOD GLUCOSE          Height (cm): 160 (04-24 @ 10:02)  Weight (kg): 89.8 (04-24 @ 10:02)  BMI (kg/m2): 35.1 (04-24 @ 10:02)  BSA (m2): 1.93 (04-24 @ 10:02)    PHYSICAL EXAM:   General: Alert, NAD  Neuro: A+Ox3  HEENT: NC/AT, no asymmetry, no scleral icterus  Neck: Soft, supple  Cardio: RRR   Resp: Airway patent, unlabored breathing  Thorax: L breast with erythema/induration over inferolateral aspect, punctate opening with spontaneous purulent drainage, TTP, minimal fluctuance   GI/Abd: Soft, NT/ND, no rebound/guarding, no masses palpated  Vascular: All 4 extremities warm   Skin: Intact, no breakdown  Musculoskeletal: All 4 extremities moving spontaneously, no limitations  --------------------------------------------------------------------------------------------    LABS  CBC (04-24 @ 10:46)                              11.6                           14.71<H>  )----------------(  367        70.5  % Neutrophils, 15.4  % Lymphocytes, ANC: 10.36<H>                              37.1      BMP (04-24 @ 10:46)             135     |  98      |  7     		Ca++ --      Ca 9.1                ---------------------------------( 256<H>		Mg --                 3.3<L>  |  24      |  0.42<L>			Ph --        LFTs (04-24 @ 10:46)      TPro 8.0 / Alb 4.0 / TBili 0.3 / DBili -- / AST 18 / ALT 20 / AlkPhos 53            --------------------------------------------------------------------------------------------    MICROBIOLOGY      --------------------------------------------------------------------------------------------    IMAGING

## 2023-04-25 RX ORDER — CHLORHEXIDINE GLUCONATE 213 G/1000ML
1 SOLUTION TOPICAL
Refills: 0 | Status: DISCONTINUED | OUTPATIENT
Start: 2023-04-25 | End: 2023-04-27

## 2023-04-26 LAB
-  AMPICILLIN/SULBACTAM: SIGNIFICANT CHANGE UP
-  CEFAZOLIN: SIGNIFICANT CHANGE UP
-  CLINDAMYCIN: SIGNIFICANT CHANGE UP
-  ERYTHROMYCIN: SIGNIFICANT CHANGE UP
-  GENTAMICIN: SIGNIFICANT CHANGE UP
-  OXACILLIN: SIGNIFICANT CHANGE UP
-  PENICILLIN: SIGNIFICANT CHANGE UP
-  RIFAMPIN: SIGNIFICANT CHANGE UP
-  TETRACYCLINE: SIGNIFICANT CHANGE UP
-  TRIMETHOPRIM/SULFAMETHOXAZOLE: SIGNIFICANT CHANGE UP
-  VANCOMYCIN: SIGNIFICANT CHANGE UP
CULTURE RESULTS: SIGNIFICANT CHANGE UP
METHOD TYPE: SIGNIFICANT CHANGE UP
ORGANISM # SPEC MICROSCOPIC CNT: SIGNIFICANT CHANGE UP
ORGANISM # SPEC MICROSCOPIC CNT: SIGNIFICANT CHANGE UP
SPECIMEN SOURCE: SIGNIFICANT CHANGE UP

## 2023-04-27 NOTE — ED POST DISCHARGE NOTE - DETAILS
4/27: Spoke to patient and advised of results. She reports that the area of abscess is significantly improved with decreased pain, redness and swelling and she overall feels better. She was made aware that the main treatment for abscess is I&D but abx are given as additional treatment. Pt confirmed she has Breast Surgery f/up. Advised given improvement would not have her return to ED for iv abx but made aware if area is more red, swollen, painful or if there is increased drainage or fevers she

## 2023-04-27 NOTE — ED POST DISCHARGE NOTE - ADDITIONAL DOCUMENTATION
must return to the ED for IV abx if she cannot see her surgeon more rapidly. She endorsed understanding of results and plan  - Yanci Fall PA-C

## 2023-05-03 PROBLEM — I10 HTN (HYPERTENSION): Status: ACTIVE | Noted: 2023-05-03

## 2023-05-03 PROBLEM — E11.9 DIABETES: Status: ACTIVE | Noted: 2023-05-03

## 2023-05-03 PROBLEM — N61.1 ABSCESS OF BREAST, LEFT: Status: ACTIVE | Noted: 2023-05-03

## 2023-05-05 ENCOUNTER — NON-APPOINTMENT (OUTPATIENT)
Age: 43
End: 2023-05-05

## 2023-05-05 ENCOUNTER — APPOINTMENT (OUTPATIENT)
Dept: SURGICAL ONCOLOGY | Facility: CLINIC | Age: 43
End: 2023-05-05
Payer: MEDICAID

## 2023-05-05 VITALS
HEART RATE: 80 BPM | DIASTOLIC BLOOD PRESSURE: 86 MMHG | HEIGHT: 61 IN | RESPIRATION RATE: 16 BRPM | OXYGEN SATURATION: 98 % | WEIGHT: 193 LBS | BODY MASS INDEX: 36.44 KG/M2 | SYSTOLIC BLOOD PRESSURE: 132 MMHG

## 2023-05-05 DIAGNOSIS — I10 ESSENTIAL (PRIMARY) HYPERTENSION: ICD-10-CM

## 2023-05-05 DIAGNOSIS — N61.1 ABSCESS OF THE BREAST AND NIPPLE: ICD-10-CM

## 2023-05-05 DIAGNOSIS — Z78.9 OTHER SPECIFIED HEALTH STATUS: ICD-10-CM

## 2023-05-05 DIAGNOSIS — E11.9 TYPE 2 DIABETES MELLITUS W/OUT COMPLICATIONS: ICD-10-CM

## 2023-05-05 PROCEDURE — 99214 OFFICE O/P EST MOD 30 MIN: CPT

## 2023-05-05 RX ORDER — AMLODIPINE BESYLATE 5 MG/1
TABLET ORAL
Refills: 0 | Status: ACTIVE | COMMUNITY

## 2023-05-05 RX ORDER — ALOGLIPTIN AND METFORMIN HYDROCHLORIDE 12.5; 1 MG/1; MG/1
TABLET, FILM COATED ORAL
Refills: 0 | Status: ACTIVE | COMMUNITY

## 2023-05-05 NOTE — REVIEW OF SYSTEMS
[As Noted in HPI] : as noted in HPI [Skin Lesions] : skin lesion [Breast Lump] : breast lump [Negative] : Heme/Lymph

## 2023-05-05 NOTE — ASSESSMENT
[FreeTextEntry1] : The patient is a 42 year F presenting with L breast abscess, here for initial visit.\par \par Exam today shows post-inflammatory changes to the left lower breast, no evidence of active infection. There is a new area of induration adjacent to the prior abscess, without any warmth, erythema, fluctuance, or tenderness.  We discussed signs of infection, and when the patient should seek follow-up medical attention.  Given the findings in her left axilla and history of other similar draining sinuses in the groin, I suspect the patient has hidradenitis suppurativa.  I recommended that the patient seek follow-up with a rheumatologist and/or plastic surgeon for definitive treatment.  The patient is aware and will return to her PCP for referrals.\par \par Plan:\par - No evidence of active breast infection at this time\par -Recommend follow-up with rheumatology and/or plastic surgery for management of hidradenitis suppurativa\par \par

## 2023-05-05 NOTE — HISTORY OF PRESENT ILLNESS
[FreeTextEntry1] : The patient is a 42 year F presenting with L breast abscess, here for initial visit.\par \par Patient presented to the ED on 4/24/2023 with left-sided breast pain and swelling.  Patient was seen here 1 month ago for similar pain and swelling, was determined to have a superficial breast abscess which was drained by breast surgery, patient then completed a 7-day course of clindamycin.  Patient notes that since Wednesday has had pain and swelling to the same area which has progressively worsened.  Patient denies any associated\par fevers, notes that she took Tylenol 2 hours prior to presentation with no relief. \par \par In the ED, exam with spontaneously draining L breast abscess. Bedside exam with erythema/induration, minimal fluctuance. Bedside US w small 1.5x1.5cm pocket. Aspirated and incised with 11 blade. ~5cc of purulent drainage expressed. Wound packed with packing strip and covered with 4x4. She as discharged on Doxycycline x 7 days. Wound cultures revealed numerous staphylococcus epidermidis and few Prevotella bivia. \par \par The patient reports that she removed her wound packing as instructed, and the wound has been healing by itself.  She notes still some hardness in the area but the redness and tenderness has resolved. More medially on the underside of her breast she does notice a second location where there is some firmness, but denies any redness or pain on palpation.  Denies fevers.  She also notes that she previously had similar episodes about 5 years ago on the underside of her right breast.  Currently she has some draining wounds on her left axilla, compatible with hidradenitis suppurativa.  She reports that she often has wounds in her axilla as well as in her groin.\par \par Most recent imaging:\par 3/25/2023 L US ([facility])\par - L 4-5:00 N10: 2.4 x 1.5 x 3 cm fluid collection w/ overlying skin thickening and surrounding hyperemia, suspicious for abscess -> f/u clinically\par - If symptoms do not resolve clinically, additional imaging in a dedicated breast imaging center should be performed to exclude the possibility of malignancy.\par \par PMH: Asthma, HTN, DM\par PSH: Ventral hernia repair\par Meds: Alogliptin and metformin, amlodipine\par ALL: Denies\par SH: No tobacco.  Rare EtOH\par FH: Paternal great great aunt with unknown cancer, unsure age.  Paternal grandmother with lymph node cancer.\par GYN: Menarche 15.  LMP 5/1/2023, regular.  G0, P0.  OCP none.  Fertility none.  HRT none.

## 2023-05-05 NOTE — PHYSICAL EXAM
[Normocephalic] : normocephalic [Atraumatic] : atraumatic [EOMI] : extra ocular movement intact [PERRL] : pupils equal, round and reactive to light [Sclera nonicteric] : sclera nonicteric [Supple] : supple [No Supraclavicular Adenopathy] : no supraclavicular adenopathy [No Cervical Adenopathy] : no cervical adenopathy [Examined in the supine and seated position] : examined in the supine and seated position [Bra Size: ___] : Bra Size: [unfilled] [Grade 2] : Ptosis Grade 2 [No dominant masses] : no dominant masses in right breast  [No Nipple Retraction] : no left nipple retraction [No Nipple Discharge] : no left nipple discharge [No Axillary Lymphadenopathy] : no left axillary lymphadenopathy [No Edema] : no edema [No Rashes] : no rashes [No Ulceration] : no ulceration [Breast Nipple Inversion] : nipples not inverted [Breast Nipple Retraction] : nipples not retracted [Breast Nipple Flattening] : nipples not flattened [Breast Nipple Fissures] : nipples not fissured [de-identified] : non-labored respirations  [de-identified] : thickened skin with multiple sinuses with fibrinous exudate, no fluctuance, no active drainage

## 2023-05-05 NOTE — PAST MEDICAL HISTORY
[Menstruating] : The patient is menstruating [Menarche Age ____] : age at menarche was [unfilled] [Definite ___ (Date)] : the last menstrual period was [unfilled] [Regular Cycle Intervals] : have been regular [Total Preg ___] : G[unfilled] [History of Hormone Replacement Treatment] : has no history of hormone replacement treatment [FreeTextEntry6] : None. [FreeTextEntry7] : None. [FreeTextEntry8] : None.

## 2023-05-05 NOTE — CONSULT LETTER
[Dear  ___] : Dear  [unfilled], [Courtesy Letter:] : I had the pleasure of seeing your patient, [unfilled], in my office today. [Please see my note below.] : Please see my note below. [Consult Closing:] : Thank you very much for allowing me to participate in the care of this patient.  If you have any questions, please do not hesitate to contact me. [Sincerely,] : Sincerely, [FreeTextEntry3] : Ciara Irvin MD\par Breast Surgeon\par Division of Surgical Oncology\par Department of Surgery\par [address] [place] [address]\[place] [address] \par Tel: [phone]\par Fax: [phone]\par Email: [email or web address]  [FreeTextEntry2] : Dr. Sandrine Vasquez\par [id number] [place] [address]\[place] [address]\par Fax: [phone]

## 2023-09-27 NOTE — ED ADULT TRIAGE NOTE - CHIEF COMPLAINT QUOTE
Internal Medicine Progress Note          History Of Present Illness  Juma is a 79 year old male presenting with Juma is a 79 year old male presenting with worsening shortness of breath.  History of stage IV non-small cell lung cancer with pulmonary metastasis.  Follows at [facility] with Dr. Xie.  History of aspiration pneumonia.  Just discharged September 13 from [facility].  Presents with worsening shortness of breath.  Seen in the emergency room with his family in \[facility]\"".  Was on BiPAP earlier.  Uses oxygen at home 24/7 3 L nasal cannula.  Still independent was driving a few days ago.  Has had productive cough no fever.  Has recurrent pleural effusions.  Had a left Pleurx catheter which was removed last year.  Has a right Pleurx catheter now.  Wife drained 6 to 700 cc daily usually yeyo-colored fluid. Diagnosed with lung cancer in 2018.  Had initially complete collapse of his left lung due to large pleural effusion.  Surgical pathology at that time positive for adenocarcinoma.  Was to start on Taxol.  Family now considering hospice care.In the emergency room blood pressure 98/64 afebrile White count 19,000 hemoglobin 12 platelets 283 pH 7.22 PO2 65 CO2 78 proBNP 7100 troponin 108 lactate 1.2 sodium is 132 BUN 17 creatinine 1.3 albumin is 2.2 chest x-ray COPD.  Bilateral pleural effusions large effusion on the right progression of bilateral reticular and hazy densities.  Bibasilar atelectasis or consolidation new since prior exam.    9/27 Seen earlier with his family.  Appears much more comfortable today.  Still in atrial flutter rate in the 70s.  Diltiazem drip turned off earlier because of hypotension.  Says he plans on driving to [place] to [facility] on Saturday.  Wants to know if this will be okay.  Brought it up with cardiologist.  Not sure if he is spoken to the pulmonologist.  Uses oxygen at home at night though has been using it during the day as well with activity.  Chronic cough.Afebrile.   Blood pressure 108/77 4 L nasal cannula currently.  Sodium is 133 potassium 5.4 BUN 23 creatinine 1.1 transaminases are normal albumin quite low 1.9 white count 9000 hemoglobin 10 platelets 218 left shift 70s 96% segs.  Blood cultures no growth 1 day.     Past Medical History  Past Medical History:   Diagnosis Date   • AAA (abdominal aortic aneurysm) (CMD)    • Arthritis    • Carpal tunnel syndrome    • Cataract    • COPD (chronic obstructive pulmonary disease) (CMD)    • Lung cancer (CMD)     STAGE 4   • Neuropathy    • Sebaceous cyst         Surgical History  Past Surgical History:   Procedure Laterality Date   • Abdominal aortic aneurysm repair, open  11/25/2018   • Carpal tunnel release     • Cataract extraction w/  intraocular lens implant Bilateral    • Destruction benign lesions (other than skin tags) 1- 14 lesions     • Ir tunneled pleural catheter insertion  03/01/2023   • Picc line insertion  11/27/2020             Allergies  ALLERGIES:  Patient has no active allergies.    Medications  Current Facility-Administered Medications   Medication Dose Route Frequency Provider Last Rate Last Admin   • dilTIAZem (CARDIZEM) tablet 30 mg  30 mg Oral 4 times per day Mattie Grissom MD   30 mg at 09/27/23 1509   • apixaBAN (ELIQUIS) tablet 5 mg  5 mg Oral BID Reese Chris MD   5 mg at 09/27/23 0835   • gabapentin (NEURONTIN) capsule 300 mg  300 mg Oral QHS Reese Chris MD   300 mg at 09/26/23 2135   • umeclidinium-vilanterol (ANORO ELLIPTA) 62.5-25 MCG/ACT inhaler 1 puff  1 puff Inhalation Daily Resp Reese Chris MD   1 puff at 09/27/23 0936   • sodium chloride 0.9 % flush bag 25 mL  25 mL Intravenous PRN Reese Chris MD       • sodium chloride 0.9 % injection 2 mL  2 mL Intracatheter 2 times per day Reese Chris MD   2 mL at 09/27/23 0842   • sodium chloride 0.9 % flush bag 25 mL  25 mL Intravenous PRN Reese Chris MD       • Potassium Standard Replacement Protocol (Levels 3.5 and lower)   Does  left breast abscess recurrence. not apply See Admin Instructions Reese Chris MD       • Potassium Replacement (Levels 3.6 - 4)   Does not apply See Admin Instructions Reese Chris MD       • Magnesium Standard Replacement Protocol   Does not apply See Admin Instructions Reese Chris MD       • acetaminophen (TYLENOL) tablet 650 mg  650 mg Oral Q4H PRN Reese Chris MD       • methylPREDNISolone (SOLU-Medrol) injection 40 mg  40 mg Intravenous 2 times per day Nidhi Veras MD   40 mg at 09/27/23 0835   • cefepime (MAXIPIME) 1,000 mg in sodium chloride 0.9 % 100 mL IVPB  1,000 mg Intravenous 2 times per day Nidhi Veras  mL/hr at 09/27/23 0852 1,000 mg at 09/27/23 0852   • albuterol (VENTOLIN) nebulizer 2.5 mg  2.5 mg Nebulization Q4H Resp PRN Shon Davis MD       • furosemide (LASIX INJECT) injection 20 mg  20 mg Intravenous BID Mattie Grissom MD   20 mg at 09/27/23 0835       Prior to Admission medications    Medication Sig Start Date End Date Taking? Authorizing Provider   Eliquis 5 MG Tab Take 5 mg by mouth in the morning and 5 mg in the evening. 9/5/23  Yes Provider, Outside   dilTIAZem (CARDIZEM CD) 120 MG 24 hr capsule Take 120 mg by mouth daily.   Yes Provider, Outside   tiotropium-olodaterol (Stiolto Respimat) 2.5-2.5 MCG/ACT inhaler Inhale 2 puffs into the lungs daily.   Yes Provider, Outside   MAGNESIUM OXIDE PO Take 1 tablet by mouth daily.   Yes Provider, Outside   albuterol 108 (90 Base) MCG/ACT inhaler Inhale 2 puffs into the lungs every 4 hours as needed for Shortness of Breath or Wheezing.   Yes Provider, Outside   gabapentin (NEURONTIN) 300 MG capsule Take 300 mg by mouth at bedtime. 5/19/22  Yes Provider, Outside   metroNIDAZOLE (METROCREAM) 0.75 % cream Apply topically 2 times daily. 11/26/21   Reese Chris MD       Review of Systems  Review of Systems   Constitutional: Positive for appetite change and unexpected weight change.   HENT: Negative.    Eyes: Negative.    Respiratory: Positive  for cough and shortness of breath.    Cardiovascular: Negative.    Gastrointestinal: Negative.    Endocrine: Negative.    Genitourinary: Negative.    Musculoskeletal: Negative.    Skin: Negative.    Allergic/Immunologic: Negative.    Neurological: Positive for weakness.   Hematological: Negative.    Psychiatric/Behavioral: Negative.          Physical Exam  Physical Exam  Vitals and nursing note reviewed.   Constitutional:       Appearance: He is well-developed.      Comments: thin   HENT:      Head: Normocephalic and atraumatic.      Right Ear: External ear normal.      Left Ear: External ear normal.      Nose: Nose normal.      Neck: Normal range of motion and neck supple.   Eyes:      General: No scleral icterus.        Right eye: No discharge.         Left eye: No discharge.      Conjunctiva/sclera: Conjunctivae normal.      Pupils: Pupils are equal, round, and reactive to light.   Neck:      Thyroid: No thyromegaly.      Vascular: No JVD.      Trachea: No tracheal deviation.   Cardiovascular:      Rate and Rhythm: Tachycardia present. Rhythm irregular.      Heart sounds: Normal heart sounds. No murmur heard.     No friction rub. No gallop.   Pulmonary:      Effort: Pulmonary effort is normal.      Breath sounds: No stridor. No wheezing or rales.      Comments: Decreased bs but clear; bases ok; crackles left upper posteriorly  Chest:      Chest wall: No tenderness.   Abdominal:      General: Bowel sounds are normal. There is no distension.      Palpations: Abdomen is soft. There is no mass.      Tenderness: There is no abdominal tenderness. There is no guarding or rebound.   Musculoskeletal:         General: No tenderness or deformity. Normal range of motion.   Lymphadenopathy:      Cervical: No cervical adenopathy.   Skin:     General: Skin is warm and dry.      Coloration: Skin is not pale.      Findings: No erythema or rash.   Neurological:      Mental Status: He is alert and oriented to person, place, and  time.      Cranial Nerves: No cranial nerve deficit.      Motor: No abnormal muscle tone.      Coordination: Coordination normal.   Psychiatric:         Behavior: Behavior normal.         Thought Content: Thought content normal.         Judgment: Judgment normal.          Last Recorded Vitals  Blood pressure 108/77, pulse 71, temperature 96.1 °F (35.6 °C), temperature source Temporal, resp. rate 17, height 6' (1.829 m), weight 80.4 kg (177 lb 4 oz), SpO2 96 %.  Body mass index is 24.04 kg/m².     Relevant Results    Imaging  CT HEAD WO CONTRAST   Final Result   1. Ovoid 3.5 mm hyperdensity at the level of the medial cerebellum at the   level of the fourth ventricle without significant mass effect. This could   reflect a benign cavernoma, though in the setting of trauma cannot exclude   small focus of hemorrhage. Recommend MRI to further delineate.   2. Small right parietal scalp laceration without subjacent calvarial   fracture.   3. Chronic microvascular ischemic changes.         Dr. Gage was notified of findings by Dr. Avery at 4:50 p.m. by   telephone prescribe today.      Electronically Signed by: EVANGELINA AVERY MD    Signed on: 9/26/2023 4:55 PM    Workstation ID: [id number]      XR CHEST PA OR AP 1 VIEW   Final Result   1.  Mildly prominent cardiac silhouette may be exaggerated by AP technique.   2.  Findings suspicious for obstructive airways disease.   3.  Bilateral pleural effusions have progressed since the prior exam, small   on the left and small to moderate on the right, with right Pleurx catheter.   4.  Progression of bilateral reticular and hazy densities which can be seen   with edema, atelectasis, or aspiration/infection.   5.  Bibasilar atelectasis or consolidation is new since the prior exam.      Electronically Signed by: GUADALUPE STONE M.D.    Signed on: 9/26/2023 9:08 AM    Workstation ID: [id number]      XR PELVIS 1 VIEW   Final Result   1.  Bony demineralization without  acute pelvic fracture.   2.  Apparent thin band of sclerosis at the left intertrochanteric femur.    This could be artifact, but correlation with physical findings is   recommended with dedicated left hip imaging if there is clinical suspicion   for fracture.      Electronically Signed by: GUADALUPE STONE M.D.    Signed on: 9/26/2023 9:16 AM    Workstation ID: [id number]      XR CHEST AP OR PA    (Results Pending)        Labs   Admission on 09/26/2023   Component Date Value Ref Range Status   • Extra Tube 09/26/2023 Hold for Add Ons   Final   • Extra Tube 09/26/2023 Hold for Add Ons   Final   • Extra Tube 09/26/2023 Hold for Add Ons   Final   • Extra Tube 09/26/2023 Hold for Add Ons   Final   • Extra Tube 09/26/2023 Hold for Add Ons   Final   • Ventricular Rate EKG/Min (BPM) 09/26/2023 139   Final   • Atrial Rate (BPM) 09/26/2023 278   Final   • QRS-Interval (MSEC) 09/26/2023 88   Final   • QT-Interval (MSEC) 09/26/2023 360   Final   • QTc 09/26/2023 547   Final   • R Axis (Degrees) 09/26/2023 45   Final   • T Axis (Degrees) 09/26/2023 60   Final   • REPORT TEXT 09/26/2023    Final                    Value:Atrial flutter  with 2:1 AV conduction  Septal infarct  , age undetermined  Abnormal ECG  No previous ECGs available  Confirmed by YOLANDA HUTCHINSON MD ([id number]) on 9/26/2023 5:05:07 PM     • Sodium 09/26/2023 132 (L)  135 - 145 mmol/L Final   • Potassium 09/26/2023 4.7  3.4 - 5.1 mmol/L Final   • Chloride 09/26/2023 99  97 - 110 mmol/L Final   • Carbon Dioxide 09/26/2023 30  21 - 32 mmol/L Final   • Anion Gap 09/26/2023 8  7 - 19 mmol/L Final   • Glucose 09/26/2023 170 (H)  70 - 99 mg/dL Final   • BUN 09/26/2023 17  6 - 20 mg/dL Final   • Creatinine 09/26/2023 1.32 (H)  0.67 - 1.17 mg/dL Final   • Glomerular Filtration Rate 09/26/2023 55 (L)  >=60 Final    eGFR 30-59 mL/min/1.73m2 = Moderate decrease in kidney function. Stage 3 CKD (chronic kidney disease) or moderate kidney disease. Estimated GFR calculated using  the CKD-EPI-R (2021) equation that does not include race in the creatinine calculation.   • BUN/Cr 09/26/2023 13  7 - 25 Final   • Calcium 09/26/2023 8.3 (L)  8.4 - 10.2 mg/dL Final   • Bilirubin, Total 09/26/2023 0.3  0.2 - 1.0 mg/dL Final   • GOT/AST 09/26/2023 18  <=37 Units/L Final   • GPT/ALT 09/26/2023 19  <64 Units/L Final   • Alkaline Phosphatase 09/26/2023 85  45 - 117 Units/L Final   • Albumin 09/26/2023 2.2 (L)  3.6 - 5.1 g/dL Final   • Protein, Total 09/26/2023 5.8 (L)  6.4 - 8.2 g/dL Final   • Globulin 09/26/2023 3.6  2.0 - 4.0 g/dL Final   • A/G Ratio 09/26/2023 0.6 (L)  1.0 - 2.4 Final   • Lactate, Venous 09/26/2023 1.2  0.0 - 2.0 mmol/L Final   • Troponin I, High Sensitivity 09/26/2023 108 (HH)  <77 ng/L Final    ;Critical Result(s) Called at: 08:55:52 on 09/26/2023 by: Delilah Julien to and read back by: Magalie Blanca RN  Consistent with myocardial injury.  Mild elevations of troponin may indicate noninfarction cardiac injury or early myocardial infarction.  Serial studies may be helpful.   • NT-proBNP 09/26/2023 7,106 (H)  <=450 pg/mL Final   • WBC 09/26/2023 19.3 (H)  4.2 - 11.0 K/mcL Final   • RBC 09/26/2023 3.83 (L)  4.50 - 5.90 mil/mcL Final   • HGB 09/26/2023 12.1 (L)  13.0 - 17.0 g/dL Final   • HCT 09/26/2023 40.8  39.0 - 51.0 % Final   • MCV 09/26/2023 106.5 (H)  78.0 - 100.0 fl Final   • MCH 09/26/2023 31.6  26.0 - 34.0 pg Final   • MCHC 09/26/2023 29.7 (L)  32.0 - 36.5 g/dL Final   • RDW-CV 09/26/2023 17.4 (H)  11.0 - 15.0 % Final   • RDW-SD 09/26/2023 68.6 (H)  39.0 - 50.0 fL Final   • PLT 09/26/2023 283  140 - 450 K/mcL Final   • NRBC 09/26/2023 0  <=0 /100 WBC Final   • Neutrophil, Percent 09/26/2023 87  % Final   • Lymphocytes, Percent 09/26/2023 2  % Final   • Mono, Percent 09/26/2023 7  % Final   • Eosinophils, Percent 09/26/2023 0  % Final   • Basophils, Percent 09/26/2023 1  % Final   • Immature Granulocytes 09/26/2023 3  % Final   • Absolute Neutrophils 09/26/2023 17.0 (H)  1.8  - 7.7 K/mcL Final   • Absolute Lymphocytes 09/26/2023 0.3 (L)  1.0 - 4.0 K/mcL Final   • Absolute Monocytes 09/26/2023 1.3 (H)  0.3 - 0.9 K/mcL Final   • Absolute Eosinophils  09/26/2023 0.1  0.0 - 0.5 K/mcL Final   • Absolute Basophils 09/26/2023 0.1  0.0 - 0.3 K/mcL Final   • Absolute Immature Granulocytes 09/26/2023 0.5 (H)  0.0 - 0.2 K/mcL Final   • Ionized Calcium 09/26/2023 1.23  1.15 - 1.29 mmol/L Final   • Ionized Calcium, Corrected 09/26/2023 1.10 (L)  1.15 - 1.29 mmol/L Final    Differences between ionized calcium and ionized calcium normalized to pH 7.4 most often result from samples that have an abnormal pH due to delayed processing or exposure to air.   • CONDITION - RESPIRATORY 09/26/2023 5L   Final   • CARBOXYHEMOGLOBIN - RESPIRATORY 09/26/2023 1.9 (H)  <1.5 % Final   • HEMOGLOBIN - RESPIRATORY 09/26/2023 12.3 (L)  13.0 - 17.0 g/dL Final   • METHEMOGLOBIN - RESPIRATORY 09/26/2023 0.0  <=1.6 % Final   • SITE - RESPIRATORY 09/26/2023 Venous   Final   • TEMPERATURE - RESPIRATORY 09/26/2023 37.0  degrees C Final   • BASE EXCESS / DEFICIT, VENOUS - RE* 09/26/2023 2  -2 - 2 mmol/L Final   • HCO3, VENOUS - RESPIRATORY 09/26/2023 31.9 (H)  22.0 - 28.0 mmol/L Final   • PCO2, VENOUS - RESPIRATORY 09/26/2023 78 (H)  41 - 54 mm Hg Final   • PH, VENOUS - RESPIRATORY 09/26/2023 7.22 (L)  7.35 - 7.45 Units Final   • O2 SATURATION, VENOUS - RESPIRATORY 09/26/2023 65  60 - 80 % Final   • PO2, VENOUS - RESPIRATORY 09/26/2023 43 (H)  35 - 42 mm Hg Final   • OXYHEMOGLOBIN, VENOUS - RESPIRATORY 09/26/2023 63.7  60.0 - 80.0 % Final   • HEMATOCRIT - RESPIRATORY 09/26/2023 37.0 (L)  39.0 - 51.0 % Final   • Culture, Blood or Bone Marrow 09/26/2023 No Growth 1 Day.   Preliminary   • GLUCOSE, BEDSIDE - POINT OF CARE 09/26/2023 131 (H)  70 - 99 mg/dL Final   • Troponin I, High Sensitivity 09/26/2023 51  <77 ng/L Final   • Magnesium 09/27/2023 2.1  1.7 - 2.4 mg/dL Final   • Sodium 09/27/2023 133 (L)  135 - 145 mmol/L Final   •  Potassium 09/27/2023 5.4 (H)  3.4 - 5.1 mmol/L Final   • Chloride 09/27/2023 101  97 - 110 mmol/L Final   • Carbon Dioxide 09/27/2023 33 (H)  21 - 32 mmol/L Final   • Anion Gap 09/27/2023 4 (L)  7 - 19 mmol/L Final   • Glucose 09/27/2023 152 (H)  70 - 99 mg/dL Final   • BUN 09/27/2023 23 (H)  6 - 20 mg/dL Final   • Creatinine 09/27/2023 1.17  0.67 - 1.17 mg/dL Final   • Glomerular Filtration Rate 09/27/2023 63  >=60 Final    eGFR results = or >60 mL/min/1.73m2 = Normal kidney function. Estimated GFR calculated using the CKD-EPI-R (2021) equation that does not include race in the creatinine calculation.   • BUN/Cr 09/27/2023 20  7 - 25 Final   • Calcium 09/27/2023 8.1 (L)  8.4 - 10.2 mg/dL Final   • Bilirubin, Total 09/27/2023 0.2  0.2 - 1.0 mg/dL Final   • GOT/AST 09/27/2023 9  <=37 Units/L Final   • GPT/ALT 09/27/2023 16  <64 Units/L Final   • Alkaline Phosphatase 09/27/2023 71  45 - 117 Units/L Final   • Albumin 09/27/2023 1.9 (L)  3.6 - 5.1 g/dL Final   • Protein, Total 09/27/2023 5.1 (L)  6.4 - 8.2 g/dL Final   • Globulin 09/27/2023 3.2  2.0 - 4.0 g/dL Final   • A/G Ratio 09/27/2023 0.6 (L)  1.0 - 2.4 Final   • WBC 09/27/2023 9.4  4.2 - 11.0 K/mcL Final   • RBC 09/27/2023 3.45 (L)  4.50 - 5.90 mil/mcL Final   • HGB 09/27/2023 10.6 (L)  13.0 - 17.0 g/dL Final   • HCT 09/27/2023 35.9 (L)  39.0 - 51.0 % Final   • MCV 09/27/2023 104.1 (H)  78.0 - 100.0 fl Final   • MCH 09/27/2023 30.7  26.0 - 34.0 pg Final   • MCHC 09/27/2023 29.5 (L)  32.0 - 36.5 g/dL Final   • RDW-CV 09/27/2023 17.3 (H)  11.0 - 15.0 % Final   • RDW-SD 09/27/2023 66.8 (H)  39.0 - 50.0 fL Final   • PLT 09/27/2023 218  140 - 450 K/mcL Final   • NRBC 09/27/2023 0  <=0 /100 WBC Final   • Neutrophil, Percent 09/27/2023 96  % Final   • Lymphocytes, Percent 09/27/2023 2  % Final   • Mono, Percent 09/27/2023 1  % Final   • Eosinophils, Percent 09/27/2023 0  % Final   • Basophils, Percent 09/27/2023 0  % Final   • Immature Granulocytes 09/27/2023 1  % Final    • Absolute Neutrophils 09/27/2023 9.1 (H)  1.8 - 7.7 K/mcL Final   • Absolute Lymphocytes 09/27/2023 0.1 (L)  1.0 - 4.0 K/mcL Final   • Absolute Monocytes 09/27/2023 0.1 (L)  0.3 - 0.9 K/mcL Final   • Absolute Eosinophils  09/27/2023 0.0  0.0 - 0.5 K/mcL Final   • Absolute Basophils 09/27/2023 0.0  0.0 - 0.3 K/mcL Final   • Absolute Immature Granulocytes 09/27/2023 0.1  0.0 - 0.2 K/mcL Final   • GLUCOSE, BEDSIDE - POINT OF CARE 09/26/2023 226 (H)  70 - 99 mg/dL Final    Notified RN  Capillary Sample       Assessment & Plan    1. Stage IV lung cancer with progressing disease.  Family considering hospice care but going to [facility] this weekend to see if he qualifies for any clinical trials prior.   2. Bilateral pleural effusions; drained 700  Cc + yesterday.   3. Acute on chronic hypoxic and hypercarbic resp failure 2/2 HCAP; cefepime ;vanco. Video swallow 9/12 trace penetration; no aspiration. Tolerating diet.   4. Severe protein calorie malnutrition.  Supplement as able  5. Atrial flutter.  EKG shows atrial flutter with 2 1 AV conduction.  Rate initially 139 better when I saw him. Rate control for now.  Cardiology note appreciated;  Off diltiazem drip; rate better.   6. 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  7. AAA sp open resection.   8. Acute on chronic combined systolic and diastolic chf; cpm furosemide.     COVID Labs  Recent Labs   Lab 09/27/23  0703 09/26/23  0816   ALYMS 0.1* 0.3*       QT-Interval (MSEC) (no units)   Date Value   09/26/2023 360                      Code Status    Code Status: Do Not Resuscitate      Reese Chris MD

## 2023-09-30 ENCOUNTER — NON-APPOINTMENT (OUTPATIENT)
Age: 43
End: 2023-09-30

## 2025-02-10 ENCOUNTER — OUTPATIENT (OUTPATIENT)
Dept: OUTPATIENT SERVICES | Facility: HOSPITAL | Age: 45
LOS: 1 days | End: 2025-02-10

## 2025-02-10 VITALS
OXYGEN SATURATION: 99 % | SYSTOLIC BLOOD PRESSURE: 140 MMHG | HEART RATE: 84 BPM | TEMPERATURE: 98 F | DIASTOLIC BLOOD PRESSURE: 90 MMHG | RESPIRATION RATE: 16 BRPM | HEIGHT: 61.5 IN | WEIGHT: 195.11 LBS

## 2025-02-10 DIAGNOSIS — R22.32 LOCALIZED SWELLING, MASS AND LUMP, LEFT UPPER LIMB: ICD-10-CM

## 2025-02-10 DIAGNOSIS — E11.9 TYPE 2 DIABETES MELLITUS WITHOUT COMPLICATIONS: ICD-10-CM

## 2025-02-10 DIAGNOSIS — R22.2 LOCALIZED SWELLING, MASS AND LUMP, TRUNK: ICD-10-CM

## 2025-02-10 LAB
HCT VFR BLD CALC: 35.9 % — SIGNIFICANT CHANGE UP (ref 34.5–45)
HGB BLD-MCNC: 11 G/DL — LOW (ref 11.5–15.5)
MCHC RBC-ENTMCNC: 26.8 PG — LOW (ref 27–34)
MCHC RBC-ENTMCNC: 30.6 G/DL — LOW (ref 32–36)
MCV RBC AUTO: 87.6 FL — SIGNIFICANT CHANGE UP (ref 80–100)
NRBC # BLD AUTO: 0 K/UL — SIGNIFICANT CHANGE UP (ref 0–0)
NRBC # BLD: 0 /100 WBCS — SIGNIFICANT CHANGE UP (ref 0–0)
NRBC # FLD: 0 K/UL — SIGNIFICANT CHANGE UP (ref 0–0)
NRBC BLD-RTO: 0 /100 WBCS — SIGNIFICANT CHANGE UP (ref 0–0)
PLATELET # BLD AUTO: 378 K/UL — SIGNIFICANT CHANGE UP (ref 150–400)
RBC # BLD: 4.1 M/UL — SIGNIFICANT CHANGE UP (ref 3.8–5.2)
RBC # FLD: 14.6 % — HIGH (ref 10.3–14.5)
WBC # BLD: 11.71 K/UL — HIGH (ref 3.8–10.5)
WBC # FLD AUTO: 11.71 K/UL — HIGH (ref 3.8–10.5)

## 2025-02-10 NOTE — H&P PST ADULT - LAST ECHOCARDIOGRAM
Female 2014, Patient unable to recall why echo was done, result in allscripts, Minimal MR EF 65% 2014, Patient unable to recall why echo was done, result in allscripts, Minimal MR, AR,  EF 65%,

## 2025-02-10 NOTE — H&P PST ADULT - PROBLEM SELECTOR PLAN 2
Instructed to hold Jardiance 3 days prior to surgery. Last dose 2/16/25.  Also instructed to hold Alogliptin/Metformin AM dose DOS. Pt verbalized understanding. FS on admit

## 2025-02-10 NOTE — H&P PST ADULT - RESPIRATORY AND THORAX COMMENTS
h/o Asthma- Only on Albuterol prn, No h/o hospitalization, Does not recall the last time she took Albuterol

## 2025-02-10 NOTE — H&P PST ADULT - HISTORY OF PRESENT ILLNESS
44 yr old female with h/o Type II DM and preop dx of left axillary mass presents to have PST eval for Excisional biopsy left axillary mass.

## 2025-02-10 NOTE — H&P PST ADULT - NSICDXPASTMEDICALHX_GEN_ALL_CORE_FT
PAST MEDICAL HISTORY:  Asthma in childhood    H/O ventral hernia     Hyperlipidemia     Type 2 diabetes mellitus

## 2025-02-10 NOTE — H&P PST ADULT - PROBLEM SELECTOR PLAN 1
Scheduled for Excisional biopsy left axillary mass on 2/20/25.  Pre-op instructions provided. Pt given verbal and written instructions with teach back on chlorhexidine shampoo and pepcid. Pt verbalized understanding with return demonstration.  CBC done- h/o Menorrhagia Scheduled for Excisional biopsy left axillary mass on 2/20/25.  Pre-op instructions provided. Pt given verbal and written instructions with teach back on chlorhexidine shampoo and pepcid. Pt verbalized understanding with return demonstration.  CBC done- h/o Menorrhagia  UCG on admit

## 2025-02-19 RX ORDER — EMPAGLIFLOZIN 10 MG/1
1 TABLET, FILM COATED ORAL
Refills: 0 | DISCHARGE

## 2025-02-19 NOTE — ASU PATIENT PROFILE, ADULT - AS SC BRADEN SENSORY
(3) slightly limited Double O-Z Plasty Text: The defect edges were debeveled with a #15 scalpel blade.  Given the location of the defect, shape of the defect and the proximity to free margins a Double O-Z plasty (double transposition flap) was deemed most appropriate.  Using a sterile surgical marker, the appropriate transposition flaps were drawn incorporating the defect and placing the expected incisions within the relaxed skin tension lines where possible. The area thus outlined was incised deep to adipose tissue with a #15 scalpel blade.  The skin margins were undermined to an appropriate distance in all directions utilizing iris scissors.  Hemostasis was achieved with electrocautery.  The flaps were then transposed into place, one clockwise and the other counterclockwise, and anchored with interrupted buried subcutaneous sutures.

## 2025-02-20 ENCOUNTER — OUTPATIENT (OUTPATIENT)
Dept: OUTPATIENT SERVICES | Facility: HOSPITAL | Age: 45
LOS: 1 days | Discharge: HOME | End: 2025-02-20
Payer: MEDICAID

## 2025-02-20 ENCOUNTER — TRANSCRIPTION ENCOUNTER (OUTPATIENT)
Age: 45
End: 2025-02-20

## 2025-02-20 VITALS
DIASTOLIC BLOOD PRESSURE: 83 MMHG | OXYGEN SATURATION: 97 % | RESPIRATION RATE: 18 BRPM | SYSTOLIC BLOOD PRESSURE: 124 MMHG | HEART RATE: 74 BPM | TEMPERATURE: 98 F

## 2025-02-20 VITALS
SYSTOLIC BLOOD PRESSURE: 150 MMHG | WEIGHT: 195.11 LBS | HEART RATE: 81 BPM | TEMPERATURE: 98 F | OXYGEN SATURATION: 100 % | RESPIRATION RATE: 15 BRPM | DIASTOLIC BLOOD PRESSURE: 101 MMHG | HEIGHT: 61.5 IN

## 2025-02-20 DIAGNOSIS — R22.2 LOCALIZED SWELLING, MASS AND LUMP, TRUNK: ICD-10-CM

## 2025-02-20 LAB
GLUCOSE BLDC GLUCOMTR-MCNC: 171 MG/DL — HIGH (ref 70–99)
HCG UR QL: NEGATIVE — SIGNIFICANT CHANGE UP

## 2025-02-20 PROCEDURE — 88364 INSITU HYBRIDIZATION (FISH): CPT | Mod: 26

## 2025-02-20 PROCEDURE — 88341 IMHCHEM/IMCYTCHM EA ADD ANTB: CPT | Mod: 26

## 2025-02-20 PROCEDURE — 88365 INSITU HYBRIDIZATION (FISH): CPT | Mod: 26

## 2025-02-20 PROCEDURE — 88304 TISSUE EXAM BY PATHOLOGIST: CPT | Mod: 26

## 2025-02-20 PROCEDURE — 88305 TISSUE EXAM BY PATHOLOGIST: CPT | Mod: 26

## 2025-02-20 PROCEDURE — 88342 IMHCHEM/IMCYTCHM 1ST ANTB: CPT | Mod: 26

## 2025-02-20 PROCEDURE — 88360 TUMOR IMMUNOHISTOCHEM/MANUAL: CPT | Mod: 26,59

## 2025-02-20 RX ORDER — ALBUTEROL 90 MCG
2 AEROSOL REFILL (GRAM) INHALATION
Refills: 0 | DISCHARGE

## 2025-02-20 RX ORDER — FENTANYL CITRATE-0.9 % NACL/PF 100MCG/2ML
25 SYRINGE (ML) INTRAVENOUS
Refills: 0 | Status: DISCONTINUED | OUTPATIENT
Start: 2025-02-20 | End: 2025-02-20

## 2025-02-20 RX ORDER — OXYCODONE HYDROCHLORIDE 30 MG/1
1 TABLET ORAL
Qty: 4 | Refills: 0
Start: 2025-02-20 | End: 2025-02-20

## 2025-02-20 RX ORDER — ONDANSETRON HCL/PF 4 MG/2 ML
4 VIAL (ML) INJECTION ONCE
Refills: 0 | Status: ACTIVE | OUTPATIENT
Start: 2025-02-20 | End: 2026-01-19

## 2025-02-20 RX ORDER — ALOGLIPTIN AND METFORMIN HYDROCHLORIDE 12.5; 1 MG/1; MG/1
1 TABLET, FILM COATED ORAL
Refills: 0 | DISCHARGE

## 2025-02-20 RX ORDER — ATORVASTATIN CALCIUM 80 MG/1
1 TABLET, FILM COATED ORAL
Refills: 0 | DISCHARGE

## 2025-02-20 RX ORDER — OXYCODONE HYDROCHLORIDE 30 MG/1
5 TABLET ORAL ONCE
Refills: 0 | Status: DISCONTINUED | OUTPATIENT
Start: 2025-02-20 | End: 2025-02-20

## 2025-02-20 RX ORDER — SODIUM CHLORIDE 9 G/1000ML
1000 INJECTION, SOLUTION INTRAVENOUS
Refills: 0 | Status: ACTIVE | OUTPATIENT
Start: 2025-02-20 | End: 2026-01-19

## 2025-02-20 RX ORDER — CEFUROXIME SODIUM 1.5 G
1 VIAL (EA) INJECTION
Qty: 14 | Refills: 0
Start: 2025-02-20 | End: 2025-02-26

## 2025-02-20 NOTE — ASU DISCHARGE PLAN (ADULT/PEDIATRIC) - NURSING INSTRUCTIONS
You received IV Tylenol for pain management at _08:40 AM__. Please DO NOT take any Tylenol (Acetaminophen) containing products, such as Vicodin, Percocet, Excedrin, and cold medications for the next 6 hours (until _02:40__ PM). DO NOT TAKE MORE THAN 3000 MG OF TYLENOL in a 24 hour period.   You received IV Toradol for pain management at _08:30 AM__. Please DO NOT take Motrin/Ibuprofen/Advil/Aleve/NSAIDs (Non-Steroidal Anti-Inflammatory Drugs) for the next 6 hours (until _02:30__ PM).  Nothing in vagina, no intercourse, no douching, no tampons, no tub baths, and no swimming for 2 weeks or until cleared by M.D.

## 2025-02-20 NOTE — BRIEF OPERATIVE NOTE - OPERATION/FINDINGS
Left axillary mass identified and excised through sharp and blunt dissection, 10Fr JAVI drain placed in wound cavity, wound closed in layers. Surrounding axillary skin with hidradenitis in poor condition, excised and skin brought together and closed.

## 2025-02-20 NOTE — ASU DISCHARGE PLAN (ADULT/PEDIATRIC) - CARE PROVIDER_API CALL
Nito Wynn  Surgery  [address], [address]  [place] [address]  Phone: [phone]  Fax: [phone]  Follow Up Time:    home

## 2025-02-20 NOTE — ASU DISCHARGE PLAN (ADULT/PEDIATRIC) - FINANCIAL ASSISTANCE
[facility] provides services at a reduced cost to those who are determined to be eligible through [facility]’s financial assistance program. Information regarding [facility]’s financial assistance program can be found by going to [email or web address] or by calling [phone].

## 2025-02-26 LAB — SURGICAL PATHOLOGY STUDY: SIGNIFICANT CHANGE UP

## (undated) DEVICE — SUT MONOCRYL 4-0 18" P-3 UNDYED

## (undated) DEVICE — LIGASURE SMALL JAW

## (undated) DEVICE — SUT ETHILON 4-0 18" PS-2

## (undated) DEVICE — STAPLER SKIN MULTI DIRECTION W35

## (undated) DEVICE — SUT MONOCRYL 4-0 27" PS-2 UNDYED

## (undated) DEVICE — PACK MINOR WITH LAP

## (undated) DEVICE — POSITIONER STRAP ARMBOARD VELCRO TS-30

## (undated) DEVICE — SUT SILK 2-0 18" FS

## (undated) DEVICE — ELCTR BOVIE TIP BLADE INSULATED 2.75" EDGE

## (undated) DEVICE — BASIN SET DOUBLE

## (undated) DEVICE — SYR LUER LOK 20CC

## (undated) DEVICE — POSITIONER BLUE BOLSTER

## (undated) DEVICE — DRSG BENZOIN 0.6CC

## (undated) DEVICE — DRSG STERISTRIPS 0.5 X 4"

## (undated) DEVICE — DRAIN JACKSON PRATT 10MM FLAT FULL NO TROCAR

## (undated) DEVICE — PROTECTOR HEEL / ELBOW FLUFFY

## (undated) DEVICE — ELCTR GROUNDING PAD ADULT COVIDIEN

## (undated) DEVICE — VENODYNE/SCD SLEEVE CALF MEDIUM

## (undated) DEVICE — DRAIN RESERVOIR FOR JACKSON PRATT 100CC CARDINAL

## (undated) DEVICE — ELCTR BOVIE PENCIL SMOKE EVACUATION

## (undated) DEVICE — DRAPE LAPAROTOMY TRANSVERSE

## (undated) DEVICE — SUT VICRYL 2-0 27" SH UNDYED

## (undated) DEVICE — PREP BETADINE KIT

## (undated) DEVICE — GLV 7.5 PROTEXIS (CREAM) MICRO